# Patient Record
Sex: MALE | Race: WHITE | Employment: STUDENT | URBAN - NONMETROPOLITAN AREA
[De-identification: names, ages, dates, MRNs, and addresses within clinical notes are randomized per-mention and may not be internally consistent; named-entity substitution may affect disease eponyms.]

---

## 2018-08-27 ENCOUNTER — APPOINTMENT (OUTPATIENT)
Dept: GENERAL RADIOLOGY | Age: 18
End: 2018-08-27
Payer: COMMERCIAL

## 2018-08-27 ENCOUNTER — HOSPITAL ENCOUNTER (EMERGENCY)
Age: 18
Discharge: HOME | End: 2018-08-27
Payer: COMMERCIAL

## 2018-08-27 VITALS
HEART RATE: 69 BPM | SYSTOLIC BLOOD PRESSURE: 132 MMHG | RESPIRATION RATE: 12 BRPM | DIASTOLIC BLOOD PRESSURE: 74 MMHG | TEMPERATURE: 98.3 F | OXYGEN SATURATION: 97 %
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
## 2018-08-27 DIAGNOSIS — S39.92XA INJURY OF BACK, INITIAL ENCOUNTER: ICD-10-CM

## 2018-08-27 PROCEDURE — 72220 X-RAY EXAM SACRUM TAILBONE: CPT

## 2018-08-27 PROCEDURE — 72100 X-RAY EXAM L-S SPINE 2/3 VWS: CPT

## 2018-08-27 PROCEDURE — 99283 EMERGENCY DEPT VISIT LOW MDM: CPT

## 2018-08-27 RX ORDER — IBUPROFEN 600 MG/1
600 TABLET ORAL EVERY 6 HOURS PRN
Qty: 30 TABLET | Refills: 0 | Status: SHIPPED | OUTPATIENT
Start: 2018-08-27

## 2018-08-27 ASSESSMENT — ENCOUNTER SYMPTOMS
RHINORRHEA: 0
EYE REDNESS: 0
VOMITING: 0
EYE DISCHARGE: 0
CHEST TIGHTNESS: 0
SORE THROAT: 0
SHORTNESS OF BREATH: 0
BLOOD IN STOOL: 0
CONSTIPATION: 0
BACK PAIN: 1
WHEEZING: 0
COUGH: 0
ABDOMINAL PAIN: 0
NAUSEA: 0
DIARRHEA: 0

## 2018-08-27 ASSESSMENT — PAIN DESCRIPTION - DESCRIPTORS: DESCRIPTORS: THROBBING

## 2018-08-27 ASSESSMENT — PAIN - FUNCTIONAL ASSESSMENT: PAIN_FUNCTIONAL_ASSESSMENT: 0-10

## 2018-08-27 ASSESSMENT — PAIN DESCRIPTION - LOCATION: LOCATION: BACK

## 2018-08-27 ASSESSMENT — PAIN DESCRIPTION - PAIN TYPE: TYPE: ACUTE PAIN

## 2018-08-27 ASSESSMENT — PAIN DESCRIPTION - ORIENTATION: ORIENTATION: LOWER

## 2018-08-27 ASSESSMENT — PAIN SCALES - GENERAL
PAINLEVEL_OUTOF10: 8
PAINLEVEL_OUTOF10: 8

## 2018-08-27 NOTE — ED PROVIDER NOTES
[facility] ED  eMERGENCY dEPARTMENT eNCOUnter      Pt Name: Karin Fernandez  MRN: [id number]  [place] 2000  Date of evaluation: 8/27/2018  Provider: Diana Vo Dr       Chief Complaint   Patient presents with    Fall     fell down 2 stairs today and now has tailbone pain         HISTORY OF PRESENT ILLNESS   (Location/Symptom, Timing/Onset, Context/Setting, Quality, Duration, Modifying Factors, Severity)  Note limiting factors. Karin Fernandez is a 19 y.o. male who presents to the emergency departmentWith complaints of a fall onset today. Patient reports associated complaints include lower tailbone discomfort. Patient reports that he tripped when he fell. He states he landed directly on his bottom. He states he was able to get up and walk. Denies any head injury loss of consciousness. He denies any bowel or bladder incontinence. He denies any numbness or tingling sensation. Reports he just continued to have some slight pain when he is walking secondary to the ER for further evaluation. He denies any neck or back pain. The patient reports about. General abdominal pain nausea vomiting. No other complaints. HPI    Nursing Notes were reviewed. REVIEW OF SYSTEMS    (2-9 systems for level 4, 10 or more for level 5)     Review of Systems   Constitutional: Negative for chills, diaphoresis and fever. HENT: Negative for congestion, ear pain, rhinorrhea and sore throat. Eyes: Negative for discharge, redness and visual disturbance. Respiratory: Negative for cough, chest tightness, shortness of breath and wheezing. Cardiovascular: Negative for chest pain and palpitations. Gastrointestinal: Negative for abdominal pain, blood in stool, constipation, diarrhea, nausea and vomiting. Endocrine: Negative for polydipsia, polyphagia and polyuria. Genitourinary: Negative for decreased urine volume, difficulty urinating, dysuria, frequency and hematuria. Musculoskeletal: Positive for back pain. Negative for arthralgias and myalgias. Skin: Negative for pallor and rash. Allergic/Immunologic: Negative for food allergies and immunocompromised state. Neurological: Negative for dizziness, syncope, weakness and light-headedness. Hematological: Negative for adenopathy. Does not bruise/bleed easily. Psychiatric/Behavioral: Negative for behavioral problems and suicidal ideas. The patient is not nervous/anxious. Except as noted above the remainder of the review of systems was reviewed and negative. PAST MEDICAL HISTORY   History reviewed. No pertinent past medical history. SURGICAL HISTORY     History reviewed. No pertinent surgical history. CURRENT MEDICATIONS       Previous Medications    No medications on file       ALLERGIES     Patient has no known allergies. FAMILY HISTORY     History reviewed. No pertinent family history. SOCIAL HISTORY       Social History     Social History    Marital status: N/A     Spouse name: N/A    Number of children: N/A    Years of education: N/A     Social History Main Topics    Smoking status: Never Smoker    Smokeless tobacco: Never Used    Alcohol use None    Drug use: Unknown    Sexual activity: Not Asked     Other Topics Concern    None     Social History Narrative    None       SCREENINGS             PHYSICAL EXAM    (up to 7 for level 4, 8 or more for level 5)     ED Triage Vitals [08/27/18 1115]   BP Temp Temp Source Pulse Resp SpO2 Height Weight   98/68 98.3 °F (36.8 °C) Tympanic 69 12 97 % -- --       Physical Exam   Constitutional: He is oriented to person, place, and time. He appears well-developed and well-nourished. No distress. HENT:   Head: Normocephalic and atraumatic. Right Ear: External ear normal.   Left Ear: External ear normal.   Mouth/Throat: Oropharynx is clear and moist. No oropharyngeal exudate. Eyes: Pupils are equal, round, and reactive to light. TABLET    Take 1 tablet by mouth every 6 hours as needed for Pain       Follow-up:  [facility] ED  [address] [facility]  [phone]    If symptoms worsen, As needed    MD Megan Friedo [id number]  [phone]      call to arrange orthopedic follow up for continued pain        Final Impression:   1. Fall, initial encounter    2.  Injury of back, initial encounter               (Please note that portions of this note were completed with a voice recognition program.  Efforts were made to edit the dictations but occasionally words are mis-transcribed.)            Breezy Miller PA-C  08/27/18 2701